# Patient Record
Sex: FEMALE | Race: WHITE | Employment: UNEMPLOYED | URBAN - METROPOLITAN AREA
[De-identification: names, ages, dates, MRNs, and addresses within clinical notes are randomized per-mention and may not be internally consistent; named-entity substitution may affect disease eponyms.]

---

## 2017-01-07 RX ORDER — ESCITALOPRAM OXALATE 10 MG/1
TABLET ORAL
Qty: 30 TABLET | Refills: 0 | Status: SHIPPED | OUTPATIENT
Start: 2017-01-07 | End: 2017-02-27

## 2017-02-24 RX ORDER — ESCITALOPRAM OXALATE 10 MG/1
TABLET ORAL
Qty: 30 TABLET | Refills: 0 | OUTPATIENT
Start: 2017-02-24

## 2017-02-24 NOTE — TELEPHONE ENCOUNTER
Patient advised. Appointment scheduled.   Future Appointments  Date Time Provider Scott Lakisha   2/27/2017 11:00 AM Nasrin Joseph MD [facility]

## 2017-02-24 NOTE — TELEPHONE ENCOUNTER
LOV: 11/9/16 for anxiety and depression     ESCITALOPRAM 10 MG Oral Tab 30 tablet 0 1/7/2017      Sig :  TAKE 1 TABLET(10 MG) BY MOUTH DAILY       Route:   (none)       No future appointments. Please advise.

## 2017-02-27 NOTE — PROGRESS NOTES
Maia Diaz is a 46year old female. Here to f/u on anxiety. Doing well since starting lexapro. Saw Hetal Morrison for counseling once. No longer tearful or overwhelmed. Has not has any panic attacks past 2 months. Sleep ing well. Motivated.  Denies fee

## 2017-03-05 ENCOUNTER — HOSPITAL ENCOUNTER (OUTPATIENT)
Age: 42
Discharge: HOME | End: 2017-03-05
Attending: EMERGENCY MEDICINE
Payer: COMMERCIAL

## 2017-03-05 VITALS
SYSTOLIC BLOOD PRESSURE: 115 MMHG | RESPIRATION RATE: 16 BRPM | HEART RATE: 78 BPM | BODY MASS INDEX: 21.66 KG/M2 | TEMPERATURE: 98 F | HEIGHT: 67 IN | OXYGEN SATURATION: 99 % | WEIGHT: 138 LBS | DIASTOLIC BLOOD PRESSURE: 67 MMHG

## 2017-03-05 DIAGNOSIS — J01.00 ACUTE NON-RECURRENT MAXILLARY SINUSITIS: Primary | ICD-10-CM

## 2017-03-05 PROCEDURE — 99204 OFFICE O/P NEW MOD 45 MIN: CPT

## 2017-03-05 PROCEDURE — 99213 OFFICE O/P EST LOW 20 MIN: CPT

## 2017-03-05 RX ORDER — GUAIFENESIN 600 MG
1200 TABLET, EXTENDED RELEASE 12 HR ORAL 2 TIMES DAILY
COMMUNITY
End: 2017-04-05

## 2017-03-05 RX ORDER — METHYLPREDNISOLONE 4 MG/1
TABLET ORAL
Qty: 1 PACKAGE | Refills: 0 | Status: SHIPPED | OUTPATIENT
Start: 2017-03-05 | End: 2017-04-05

## 2017-03-05 RX ORDER — AZITHROMYCIN 250 MG/1
TABLET, FILM COATED ORAL
Qty: 1 PACKAGE | Refills: 0 | Status: SHIPPED | OUTPATIENT
Start: 2017-03-05 | End: 2017-03-10

## 2017-03-05 NOTE — ED PROVIDER NOTES
Patient presents with:  Cough/URI    HPI:     Nicanor Medel is a 46year old female who presents with chief complaint of sinus pain. Started 1 week ago with pain and pressure to the bilateral maxillary sinuses. Has some congestion prior.   No known fevers PCP in 3-4 days as needed for re-evaluation, sooner if symptoms worsen      All results reviewed and discussed with patient. See AVS for detailed discharge instructions.

## 2017-04-05 ENCOUNTER — OFFICE VISIT (OUTPATIENT)
Dept: FAMILY MEDICINE CLINIC | Facility: CLINIC | Age: 42
End: 2017-04-05

## 2017-04-05 VITALS
WEIGHT: 143 LBS | TEMPERATURE: 99 F | HEART RATE: 76 BPM | SYSTOLIC BLOOD PRESSURE: 100 MMHG | RESPIRATION RATE: 12 BRPM | BODY MASS INDEX: 22 KG/M2 | DIASTOLIC BLOOD PRESSURE: 62 MMHG

## 2017-04-05 DIAGNOSIS — R21 RASH: Primary | ICD-10-CM

## 2017-04-05 PROCEDURE — 99213 OFFICE O/P EST LOW 20 MIN: CPT | Performed by: FAMILY MEDICINE

## 2017-04-05 RX ORDER — PREDNISONE 20 MG/1
20 TABLET ORAL 2 TIMES DAILY
Qty: 10 TABLET | Refills: 0 | Status: SHIPPED | OUTPATIENT
Start: 2017-04-05 | End: 2017-04-10

## 2017-04-05 NOTE — PROGRESS NOTES
HPI:    Patient ID: Dee Nunez is a 46year old female. Patient presents with:  Rash    HPI  Rash for 5 days  Arms, legs. Right and left. Itchy. Not spreading. Not painful.   No blisters   Started after walking in field of tall grass in [facility].    Revi murmur heard. Pulmonary/Chest: Effort normal and breath sounds normal.   Lymphadenopathy:     She has no cervical adenopathy. Skin: Rash noted. Rash is maculopapular (b/l legs below knee, few spots on b/l forearm). Vitals reviewed.     Blood pressure 1

## 2017-05-04 RX ORDER — ALPRAZOLAM 0.25 MG/1
TABLET ORAL
Qty: 30 TABLET | Refills: 0 | OUTPATIENT
Start: 2017-05-04

## 2017-05-04 NOTE — TELEPHONE ENCOUNTER
LOV: 2/27/17 for anxiety and depression  Due to follow up in 6 months.     alprazolam 0.25 MG Oral Tab (Discontinued) 30 tablet 0 11/9/2016 2/27/2017      Sig :  Take 1 tablet (0.25 mg total) by mouth daily as needed for Anxiety.       Route:   Oral       P

## 2017-05-11 RX ORDER — ALPRAZOLAM 0.25 MG/1
0.25 TABLET ORAL DAILY PRN
Qty: 30 TABLET | Refills: 0 | Status: SHIPPED | OUTPATIENT
Start: 2017-05-11

## 2017-05-17 ENCOUNTER — TELEPHONE (OUTPATIENT)
Dept: FAMILY MEDICINE CLINIC | Facility: CLINIC | Age: 42
End: 2017-05-17

## 2017-05-17 NOTE — TELEPHONE ENCOUNTER
Patient states that about 1 hour ago she started having blurred vision in her left eye then it started moving into the right. Complains of nausea, tingling in her hands and fingers, headache, SOB, palpitations, and weakness.    Symptoms gradually progress

## 2017-05-19 ENCOUNTER — TELEPHONE (OUTPATIENT)
Dept: FAMILY MEDICINE CLINIC | Facility: CLINIC | Age: 42
End: 2017-05-19

## 2017-05-19 NOTE — TELEPHONE ENCOUNTER
Patient went to [facility] ER on Wednesday 5/17. Patient dx with migraines. They put patient on relpax and there is a drug interaction between the relpax and lexapro. Please advise.

## 2017-05-19 NOTE — TELEPHONE ENCOUNTER
There is an increased risk of serotonin syndrome. Recommend f/u with neuro to check other med options.

## 2017-05-19 NOTE — TELEPHONE ENCOUNTER
No answer. Mailbox full. Patient can follow up with Dr. Gardenia Singleton or neuro. Patient should look for signs of serotonin syndrome - muscle spasm, tremor, sweating.

## 2017-05-19 NOTE — TELEPHONE ENCOUNTER
Patient advised. Patient stated she will follow up with Dr. Jaspal Aguilar.   Future Appointments  Date Time Provider Scott Banuelos   5/22/2017 11:20 AM Baljinder Moore MD [facility]

## 2017-05-22 ENCOUNTER — TELEPHONE (OUTPATIENT)
Dept: FAMILY MEDICINE CLINIC | Facility: CLINIC | Age: 42
End: 2017-05-22

## 2017-09-14 NOTE — TELEPHONE ENCOUNTER
LOV  04/05/2017    Last refill    escitalopram 10 MG Oral Tab 90 tablet 1 2/27/2017    Sig :  TAKE 1 TABLET(10 MG) BY MOUTH DAILY       Medication pending. Please advise. No future appointments.

## 2017-09-15 RX ORDER — ESCITALOPRAM OXALATE 10 MG/1
TABLET ORAL
Qty: 90 TABLET | Refills: 0 | Status: SHIPPED | OUTPATIENT
Start: 2017-09-15

## 2017-09-20 ENCOUNTER — TELEPHONE (OUTPATIENT)
Dept: FAMILY MEDICINE CLINIC | Facility: CLINIC | Age: 42
End: 2017-09-20

## 2018-03-14 RX ORDER — ALPRAZOLAM 0.25 MG/1
TABLET ORAL
Qty: 30 TABLET | Refills: 0 | OUTPATIENT
Start: 2018-03-14

## 2018-03-14 NOTE — TELEPHONE ENCOUNTER
ALPRAZolam 0.25 MG Oral Tab 30 tablet 0 5/11/2017     Last labs 10/26/15. Last seen on 04/05/17. Cc: rash  /62. No future appointments. Thank you.

## (undated) NOTE — MR AVS SNAPSHOT
[address] [place] [id number] [phone]               Thank you for choosing us for your health care visit with Farzad Rawls MD.  We are glad to serve you and happy to provide you with this summary of your v If you have questions, you can call [phone] to talk to our [facility] Staff. Remember, [employer] is NOT to be used for urgent needs. For medical emergencies, dial 911. Visit [email or web address] to learn more.            Visit [facility]

## (undated) NOTE — ED AVS SNAPSHOT
[facility] in [address] [address]    Phone:  [phone]    Fax:  [phone]   MRN: [id number]    Department:  [facility] in [facility]   Date of Visit:  3/5/2017           Diagnos [phone] [address] [facility], [address]  [phone] [address] [place] [address] [place]    [phone]       To Check ER Wait Times:  TEXT 'Yanet Ribera' to [id number]      Click [email or web address] reading, you will be contacted. Please make sure we have your correct phone number before you leave. After you leave, you should follow the attached instructions. I have read and understand the instructions given to me by my caregivers.         24-Hour Call the [employer]uvok for assistance with your inactive [employer] account    If you have questions, you can call [phone] to talk to our [facility] Staff. Remember, [employer] is NOT to be used for urgent needs. For medical emergencies, dial 911.     Bs

## (undated) NOTE — MR AVS SNAPSHOT
[address] [facility] [address] [phone]               Thank you for choosing us for your health care visit with Solo Mccabe MD.  We are glad to serve you and happy to provide you with this summary of your v Support Staff. Remember, [employer] is NOT to be used for urgent needs. For medical emergencies, dial 911. Visit [email or web address] to learn more.            Visit [facility] online at  [email or web address]